# Patient Record
Sex: MALE | Race: OTHER | URBAN - METROPOLITAN AREA
[De-identification: names, ages, dates, MRNs, and addresses within clinical notes are randomized per-mention and may not be internally consistent; named-entity substitution may affect disease eponyms.]

---

## 2017-02-21 ENCOUNTER — EMERGENCY (EMERGENCY)
Facility: HOSPITAL | Age: 27
LOS: 1 days | Discharge: HOME | End: 2017-02-21
Attending: EMERGENCY MEDICINE | Admitting: EMERGENCY MEDICINE
Payer: SELF-PAY

## 2017-02-21 VITALS
DIASTOLIC BLOOD PRESSURE: 77 MMHG | HEIGHT: 65 IN | SYSTOLIC BLOOD PRESSURE: 117 MMHG | TEMPERATURE: 98 F | RESPIRATION RATE: 18 BRPM | OXYGEN SATURATION: 97 % | HEART RATE: 91 BPM | WEIGHT: 156.09 LBS

## 2017-02-21 DIAGNOSIS — R07.89 OTHER CHEST PAIN: ICD-10-CM

## 2017-02-21 PROCEDURE — 71020: CPT | Mod: 26

## 2017-02-21 PROCEDURE — 99284 EMERGENCY DEPT VISIT MOD MDM: CPT | Mod: 25

## 2017-02-21 PROCEDURE — 93010 ELECTROCARDIOGRAM REPORT: CPT

## 2017-02-21 NOTE — ED PROVIDER NOTE - CHPI ED SYMPTOMS NEG
no shortness of breath/no cough/no chills/no nausea/no syncope/no diaphoresis/no vomiting/no back pain/no fever/no dizziness

## 2017-02-21 NOTE — ED PROVIDER NOTE - MEDICAL DECISION MAKING DETAILS
26y M with left-sided CP that began while at work today lifting heavy objects. Pain is exacerbated by movement. Pt sent over from urgent care for evaluation and states pain is improved. No vital sign derangement, no loud murmurs, EKG is NSR. X-ray ordered. Likely msk. 26y M with left-sided CP that began while at work today lifting heavy objects. Pain is exacerbated by movement. Pt sent over from urgent care for evaluation and states pain is improved. No vital sign derangement, no loud murmurs, EKG is NSR. X-ray ordered. Likely musculoskeletal and not acs

## 2017-02-21 NOTE — ED PROVIDER NOTE - PROGRESS NOTE DETAILS
CXR normal.  EKG nsr.  Conservative management discussed with the patient in detail.  Close PMD follow up encouraged.  Strict ED return instructions discussed in detail and patient given the opportunity to ask any questions about their discharge diagnosis and instructions

## 2017-02-21 NOTE — ED PROVIDER NOTE - NS ED MD SCRIBE ATTENDING SCRIBE SECTIONS
HISTORY OF PRESENT ILLNESS/PAST MEDICAL/SURGICAL/SOCIAL HISTORY/HIV/INTAKE ASSESSMENT/SCREENINGS/REVIEW OF SYSTEMS/PHYSICAL EXAM/VITAL SIGNS( Pullset)

## 2017-02-21 NOTE — ED PROVIDER NOTE - OBJECTIVE STATEMENT
26y M Pt with no PMHx sent by urgent care to ED for left-sided non-radiating chest pain that started suddenly today. Pt is a  and states he felt a sharp pain during heavy lifting (20-50 lbs). Pain exacerbated by moving left arm. Pain has resolved upon arrival to ED. Denies SOB, cough, fever, chills, dizziness, diaphoresis, nausea, and vomiting. Similar symptoms occurred 1 year ago. No medications.

## 2017-02-21 NOTE — ED PROVIDER NOTE - CARDIAC, MLM
Normal rate, regular rhythm.  Heart sounds S1, S2.  No murmurs, rubs or gallops. Normal rate, regular rhythm.  .  No murmurs, rubs or gallops.

## 2022-05-25 NOTE — ED ADULT NURSE NOTE - ATTEMPT TO OOB
[facility] form filled out and faxed to [facility] for Genetic Counseling referral. [facility]. The [facility] will contact patient with appointment date and time.    [facility]  P# [phone]  F# [phone]    [facility]  P# [phone]  F# [phone]     no